# Patient Record
Sex: MALE | Race: WHITE | NOT HISPANIC OR LATINO | Employment: UNEMPLOYED | URBAN - METROPOLITAN AREA
[De-identification: names, ages, dates, MRNs, and addresses within clinical notes are randomized per-mention and may not be internally consistent; named-entity substitution may affect disease eponyms.]

---

## 2024-04-09 ENCOUNTER — TRANSFERRED RECORDS (OUTPATIENT)
Dept: HEALTH INFORMATION MANAGEMENT | Facility: CLINIC | Age: 59
End: 2024-04-09
Payer: COMMERCIAL

## 2024-04-09 LAB
ALT SERPL-CCNC: 9 U/L (ref 21–72)
AST SERPL-CCNC: 19 U/L (ref 17–45)
CREATININE (EXTERNAL): 1.1 MG/DL (ref 0.8–1.5)
GFR ESTIMATED (EXTERNAL): 79 ML/MIN (ref 60–137)
GLUCOSE (EXTERNAL): 88 MG/DL (ref 70–110)
POTASSIUM (EXTERNAL): 4.2 MMOL/L (ref 3.5–5.5)
TSH SERPL-ACNC: 1.26 UIU/ML (ref 4.5–4.5)

## 2024-04-10 ENCOUNTER — TRANSFERRED RECORDS (OUTPATIENT)
Dept: HEALTH INFORMATION MANAGEMENT | Facility: CLINIC | Age: 59
End: 2024-04-10
Payer: COMMERCIAL

## 2024-04-24 ENCOUNTER — TRANSFERRED RECORDS (OUTPATIENT)
Dept: HEALTH INFORMATION MANAGEMENT | Facility: CLINIC | Age: 59
End: 2024-04-24
Payer: COMMERCIAL

## 2024-04-24 ENCOUNTER — MEDICAL CORRESPONDENCE (OUTPATIENT)
Dept: HEALTH INFORMATION MANAGEMENT | Facility: CLINIC | Age: 59
End: 2024-04-24
Payer: COMMERCIAL

## 2024-04-25 ENCOUNTER — TRANSCRIBE ORDERS (OUTPATIENT)
Dept: OTHER | Age: 59
End: 2024-04-25

## 2024-04-25 ENCOUNTER — TELEPHONE (OUTPATIENT)
Dept: CALL CENTER | Age: 59
End: 2024-04-25
Payer: COMMERCIAL

## 2024-04-25 DIAGNOSIS — H52.223 REGULAR ASTIGMATISM WITH PRESBYOPIA, BILATERAL: ICD-10-CM

## 2024-04-25 DIAGNOSIS — H47.10 PAPILLEDEMA, LEFT EYE: ICD-10-CM

## 2024-04-25 DIAGNOSIS — H47.20 OPTIC ATROPHY OF RIGHT EYE: ICD-10-CM

## 2024-04-25 DIAGNOSIS — R17 ELEVATED BILIRUBIN: Primary | ICD-10-CM

## 2024-04-25 DIAGNOSIS — H52.4 REGULAR ASTIGMATISM WITH PRESBYOPIA, BILATERAL: ICD-10-CM

## 2024-04-25 DIAGNOSIS — H46.9 OPTIC NEURITIS, RIGHT: ICD-10-CM

## 2024-04-25 NOTE — TELEPHONE ENCOUNTER
[facility] Call Center    Phone Message    May a detailed message be left on voicemail: yes     Reason for Call: Appointment Intake    Referring Provider Name: Dr Parrish Marion  [facility] (Eye dept)   [phone]  Fax [phone]   Diagnosis and/or Symptoms: Elevated bilirubin [R17]  Optic atrophy of right eye [H47.20]  Optic neuritis, right [H46.9]  Papilledema, left eye [H47.10]  Regular astigmatism with presbyopia, bilateral [H52.223, H52.4], Urgent: 3-5 Days    Dina from [facility] calling to check on status of referral. Per referral patient needs to be seen within 3-5 days. Please call patient back at [phone] to schedule. Thank you.     Action Taken: Message routed to:  [facility] Eye    Travel Screening: Not Applicable

## 2024-04-26 NOTE — TELEPHONE ENCOUNTER
FUTURE VISIT INFORMATION      FUTURE VISIT INFORMATION:  Date: 5/1/24  Time: 9:30am  Location: [facility]  REFERRAL INFORMATION:  Referring provider:  Dr Parrish Marion   Referring providers clinic:  [facility]   Reason for visit/diagnosis   Elevated bilirubin   Optic atrophy of right eye   Optic neuritis, right   Papilledema, left eye   Regular astigmatism with presbyopia, bilateral       RECORDS REQUESTED FROM:       Clinic name Comments Records Status Imaging Status   [facility]  Recs scanned into chart under 4/24/24 [facility] ER ER visit 4/23/24     Imaging MRI 4/10/24- [facility]- requested image be pushed , resent 4/29         [place] Unable to push images to PACS. Tracking slip for over night [employer] sent Track # [id number]      Made Over night [employer] tracking :[id number]

## 2024-04-26 NOTE — TELEPHONE ENCOUNTER
Spoke pt at 0925    Notes/mri report in system    Decrease right eye vision ?since last summer (20/200 vision)    Pt seen twice in last month and worsening swelling concern of nerve in left eye (better seeing eye)    Open new time slot next Wednesday with Dr. Heath at [facility] location and scheduled pt    Pt aware of date/time/location/duration//main clinic number/non-[employer] insurance.    Silviano Vieira RN 9:40 AM 04/26/24

## 2024-04-28 NOTE — PROGRESS NOTES
Russ Pickett is a 58 year old male with the following diagnoses:   1. Optic neuropathy - Both Eyes    2. Optic atrophy of right eye    3. Papilledema, left eye         Patient was sent for consultation by Dr. Marion for optic neuritis/optic nerve edema OS    HPI:  His RIGHT eye started watering in January and soonthereafter he noted that he could not see well out of the RIGHT eye.  He did not go see anyone until a few weeks ago.  He was told that his left  optic nerve was swollen and he had optic atrophy RIGHT eye. He thinks the RIGHT eye has improved from a few weeks ago.  His LEFT eye has gotten worse.  He denies pain to touch or move the eyes. He had an MRI that was questionable but no definitive optic neuritis.      Independent historians:  Patient    Review of outside testin/10/24 MRI Brain WWO:    24 Labs   CRP-normal   HGB-normal  BARB-negative     My interpretation performed today of outside testing:  These have been requested        Review of outside clinical notes:    24 -- Visit with Dr. Marion        Past medical history:    There is no problem list on file for this patient.        Medications:     HYDROcodone-acetaminophen  OMEGA-3 FISH OIL PO      Family history / social history:  Patient's mother had cataracts.     Patient  reports that he has never smoked. He does not have any smokeless tobacco history on file. He reports current alcohol use. He reports that he does not use drugs. He drives for [employer].       Exam:  Visual acuity is 20/250 RIGHT eye and 20/40 LEFT eye.  His color vision is reduced both eyes.  He has obvious optic atrophy in the right eye and optic disc edema in the left eye with cotton-wool spots and hemorrhages    Tests ordered and interpreted today:    Visual field is diminished globally in the right eye and shows an altitudinal defect superiorly in the left eye as well as an inferior arcuate defect in the left eye.  OCT shows diffuse loss of the nerve fiber  layer in the right eye and optic disc edema in the left eye.      Discussion of management / interpretation with another provider:   None    Assessment/Plan:   It is my impression that patient has an optic neuropathy.  Unfortunately I do not have his MRI images for my review.  There was a question of whether there was perineural inflammation in the right eye.  This could be consistent with optic perineuritis.  Apparently the MRI images were sent to me but they have not been received.  In an abundance of caution I will start him on prednisone 80 mg a day for 1 week followed by a taper over the course of the following week.  I will take a look at his MRI and if there is clear inflammation present and I will probably increase the prednisone over the course of 6 to 8 weeks.  I will obtain a laboratory workup for atypical optic neuritis.  I will have him follow-up in 2 weeks sooner as needed for worsening symptoms.    The patient is presenting with an - acute illness that potentially poses a threat to life or bodily function (vision).                    Attending Physician Attestation:  Complete documentation of historical and exam elements from today's encounter can be found in the full encounter summary report (not reduplicated in this progress note).  I personally obtained the chief complaint(s) and history of present illness.  I confirmed and edited as necessary the review of systems, past medical/surgical history, family history, social history, and examination findings as documented by others; and I examined the patient myself.  I personally reviewed the relevant tests, images, and reports as documented above.  I formulated and edited as necessary the assessment and plan and discussed the findings and management plan with the patient and family. I personally reviewed the ophthalmic test(s) associated with this encounter, agree with the interpretation(s) as documented by the resident/fellow, and have edited the  corresponding report(s) as necessary.  - Mckay Heath MD           Precharting:  Cheng Jones MD   Fellow, Neuro-Ophthalmology

## 2024-05-01 ENCOUNTER — PRE VISIT (OUTPATIENT)
Dept: OPHTHALMOLOGY | Facility: CLINIC | Age: 59
End: 2024-05-01

## 2024-05-01 ENCOUNTER — OFFICE VISIT (OUTPATIENT)
Dept: OPHTHALMOLOGY | Facility: CLINIC | Age: 59
End: 2024-05-01
Payer: COMMERCIAL

## 2024-05-01 ENCOUNTER — LAB (OUTPATIENT)
Dept: LAB | Facility: CLINIC | Age: 59
End: 2024-05-01
Payer: COMMERCIAL

## 2024-05-01 DIAGNOSIS — H47.10 PAPILLEDEMA, LEFT EYE: ICD-10-CM

## 2024-05-01 DIAGNOSIS — H46.9 OPTIC NEUROPATHY: Primary | ICD-10-CM

## 2024-05-01 DIAGNOSIS — H53.40 VISUAL FIELD DEFECT: Primary | ICD-10-CM

## 2024-05-01 DIAGNOSIS — H47.20 OPTIC ATROPHY OF RIGHT EYE: ICD-10-CM

## 2024-05-01 DIAGNOSIS — H46.9 OPTIC NEUROPATHY: ICD-10-CM

## 2024-05-01 LAB
B BURGDOR IGG+IGM SER QL: 0.51
BASOPHILS # BLD AUTO: 0.1 10E3/UL (ref 0–0.2)
BASOPHILS NFR BLD AUTO: 2 %
EOSINOPHIL # BLD AUTO: 0.1 10E3/UL (ref 0–0.7)
EOSINOPHIL NFR BLD AUTO: 1 %
ERYTHROCYTE [DISTWIDTH] IN BLOOD BY AUTOMATED COUNT: 13 % (ref 10–15)
HCT VFR BLD AUTO: 40.7 % (ref 40–53)
HGB BLD-MCNC: 13.7 G/DL (ref 13.3–17.7)
IMM GRANULOCYTES # BLD: 0 10E3/UL
IMM GRANULOCYTES NFR BLD: 0 %
LYMPHOCYTES # BLD AUTO: 1.4 10E3/UL (ref 0.8–5.3)
LYMPHOCYTES NFR BLD AUTO: 34 %
MCH RBC QN AUTO: 31 PG (ref 26.5–33)
MCHC RBC AUTO-ENTMCNC: 33.7 G/DL (ref 31.5–36.5)
MCV RBC AUTO: 92 FL (ref 78–100)
MONOCYTES # BLD AUTO: 0.2 10E3/UL (ref 0–1.3)
MONOCYTES NFR BLD AUTO: 6 %
NEUTROPHILS # BLD AUTO: 2.4 10E3/UL (ref 1.6–8.3)
NEUTROPHILS NFR BLD AUTO: 57 %
NRBC # BLD AUTO: 0 10E3/UL
NRBC BLD AUTO-RTO: 0 /100
PLATELET # BLD AUTO: 257 10E3/UL (ref 150–450)
RBC # BLD AUTO: 4.42 10E6/UL (ref 4.4–5.9)
T PALLIDUM AB SER QL: NONREACTIVE
WBC # BLD AUTO: 4.2 10E3/UL (ref 4–11)

## 2024-05-01 PROCEDURE — 99205 OFFICE O/P NEW HI 60 MIN: CPT | Mod: GC | Performed by: OPHTHALMOLOGY

## 2024-05-01 PROCEDURE — 36415 COLL VENOUS BLD VENIPUNCTURE: CPT | Performed by: PATHOLOGY

## 2024-05-01 PROCEDURE — 86780 TREPONEMA PALLIDUM: CPT | Performed by: OPHTHALMOLOGY

## 2024-05-01 PROCEDURE — 92083 EXTENDED VISUAL FIELD XM: CPT | Mod: GC | Performed by: OPHTHALMOLOGY

## 2024-05-01 PROCEDURE — 82164 ANGIOTENSIN I ENZYME TEST: CPT | Mod: 90 | Performed by: PATHOLOGY

## 2024-05-01 PROCEDURE — 85025 COMPLETE CBC W/AUTO DIFF WBC: CPT | Performed by: PATHOLOGY

## 2024-05-01 PROCEDURE — 86053 AQAPRN-4 ANTB FLO CYTMTRY EA: CPT | Mod: 90 | Performed by: PATHOLOGY

## 2024-05-01 PROCEDURE — 86363 MOG-IGG1 ANTB FLO CYTMTRY EA: CPT | Mod: 90 | Performed by: PATHOLOGY

## 2024-05-01 PROCEDURE — 92133 CPTRZD OPH DX IMG PST SGM ON: CPT | Mod: GC | Performed by: OPHTHALMOLOGY

## 2024-05-01 PROCEDURE — 86618 LYME DISEASE ANTIBODY: CPT | Performed by: OPHTHALMOLOGY

## 2024-05-01 PROCEDURE — 86038 ANTINUCLEAR ANTIBODIES: CPT | Performed by: OPHTHALMOLOGY

## 2024-05-01 PROCEDURE — 99000 SPECIMEN HANDLING OFFICE-LAB: CPT | Performed by: PATHOLOGY

## 2024-05-01 RX ORDER — PREDNISONE 20 MG/1
TABLET ORAL
Qty: 41 TABLET | Refills: 0 | Status: SHIPPED | OUTPATIENT
Start: 2024-05-01 | End: 2024-05-16

## 2024-05-01 ASSESSMENT — TONOMETRY
OD_IOP_MMHG: 15
IOP_METHOD: ICARE
OS_IOP_MMHG: 8

## 2024-05-01 ASSESSMENT — CONF VISUAL FIELD
OD_NORMAL: 1
OD_INFERIOR_NASAL_RESTRICTION: 0
OS_INFERIOR_TEMPORAL_RESTRICTION: 0
OS_INFERIOR_NASAL_RESTRICTION: 0
OS_SUPERIOR_NASAL_RESTRICTION: 0
OS_NORMAL: 1
METHOD: COUNTING FINGERS
OS_SUPERIOR_TEMPORAL_RESTRICTION: 0
OD_SUPERIOR_NASAL_RESTRICTION: 0
OD_INFERIOR_TEMPORAL_RESTRICTION: 0
OD_SUPERIOR_TEMPORAL_RESTRICTION: 0

## 2024-05-01 ASSESSMENT — VISUAL ACUITY
OS_SC+: +2
METHOD: SNELLEN - LINEAR
OD_SC: 20/250
OS_PH_SC+: +2
OS_PH_SC: 20/30
OD_PH_SC: 20/200
OS_SC: 20/40

## 2024-05-01 ASSESSMENT — SLIT LAMP EXAM - LIDS
COMMENTS: NORMAL
COMMENTS: NORMAL

## 2024-05-01 ASSESSMENT — EXTERNAL EXAM - RIGHT EYE: OD_EXAM: NORMAL

## 2024-05-01 ASSESSMENT — EXTERNAL EXAM - LEFT EYE: OS_EXAM: NORMAL

## 2024-05-01 ASSESSMENT — CUP TO DISC RATIO
OD_RATIO: 0.3
OS_RATIO: 0.1

## 2024-05-01 NOTE — LETTER
May 1, 2024     RE:  :  MRN: Russ Pickett  1965  [id number]     Dear Dr. Marion    Thank you for asking me to see your very pleasant patient,Russ Pickett, in neuro-ophthalmic consultation.  I would like to thank you for sending your records and I have summarized them in the history of present illness.   My assessment and plan are below.  For further details, please see my attached clinic note.      Russ Pickett is a 58 year old male with the following diagnoses:   1. Optic neuropathy - Both Eyes    2. Optic atrophy of right eye    3. Papilledema, left eye         Patient was sent for consultation by Dr. Marion for optic neuritis/optic nerve edema OS    HPI:  His RIGHT eye started watering in January and soonthereafter he noted that he could not see well out of the RIGHT eye.  He did not go see anyone until a few weeks ago.  He was told that his left  optic nerve was swollen and he had optic atrophy RIGHT eye. He thinks the RIGHT eye has improved from a few weeks ago.  His LEFT eye has gotten worse.  He denies pain to touch or move the eyes. He had an MRI that was questionable but no definitive optic neuritis.      Independent historians:  Patient    Review of outside testin/10/24 MRI Brain WWO:    24 Labs   CRP-normal   HGB-normal  BARB-negative     My interpretation performed today of outside testing:  These have been requested        Review of outside clinical notes:    24 -- Visit with Dr. Marion        Past medical history:    There is no problem list on file for this patient.        Medications:     HYDROcodone-acetaminophen  OMEGA-3 FISH OIL PO      Family history / social history:  Patient's mother had cataracts.     Patient  reports that he has never smoked. He does not have any smokeless tobacco history on file. He reports current alcohol use. He reports that he does not use drugs. He drives for [employer].       Exam:  Visual acuity is 20/250 RIGHT eye and 20/40 LEFT eye.   His color vision is reduced both eyes.  He has obvious optic atrophy in the right eye and optic disc edema in the left eye with cotton-wool spots and hemorrhages    Tests ordered and interpreted today:    Visual field is diminished globally in the right eye and shows an altitudinal defect superiorly in the left eye as well as an inferior arcuate defect in the left eye.  OCT shows diffuse loss of the nerve fiber layer in the right eye and optic disc edema in the left eye.      Discussion of management / interpretation with another provider:   None    Assessment/Plan:   It is my impression that patient has an optic neuropathy.  Unfortunately I do not have his MRI images for my review.  There was a question of whether there was perineural inflammation in the right eye.  This could be consistent with optic perineuritis.  Apparently the MRI images were sent to me but they have not been received.  In an abundance of caution I will start him on prednisone 80 mg a day for 1 week followed by a taper over the course of the following week.  I will take a look at his MRI and if there is clear inflammation present and I will probably increase the prednisone over the course of 6 to 8 weeks.  I will obtain a laboratory workup for atypical optic neuritis.  I will have him follow-up in 2 weeks sooner as needed for worsening symptoms.    The patient is presenting with an - acute illness that potentially poses a threat to life or bodily function (vision).                  Again, thank you for allowing me to participate in the care of your patient.      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

## 2024-05-02 LAB
ACE SERPL-CCNC: 25 U/L
ANA SER QL IF: NEGATIVE

## 2024-05-06 LAB
AQP4 H2O CHANNEL IGG SERPL QL: NEGATIVE
CNS DEMYELINATING DISEASE INTERPRETATION, SERUM: NORMAL
MOG FACS, S: NEGATIVE

## 2024-05-06 NOTE — RESULT ENCOUNTER NOTE
Dear Russ,     Your results were in the acceptable range. I still have not received your MRI.  Did your vision change at all with the prednisone?     Please call my office if your symptoms worsen.    Thank you for allowing me to share in your care.     Sincerely,      Mckay Heath MD

## 2024-05-08 ENCOUNTER — TELEPHONE (OUTPATIENT)
Dept: OPHTHALMOLOGY | Facility: CLINIC | Age: 59
End: 2024-05-08
Payer: COMMERCIAL

## 2024-05-08 NOTE — TELEPHONE ENCOUNTER
Recommend new tablet-- pt may contact pharmacy to review if has information on efficacy or safety of medication following microwaves to tablet    If pharmacy provides ok-- ok per clinic, otherwise would recommend new tablet    Updated pt at 0900 and pt verbally demonstrated understanding.    Silviano Vieira RN 9:02 AM 05/08/24

## 2024-05-08 NOTE — TELEPHONE ENCOUNTER
[facility] Call Center    Phone Message    May a detailed message be left on voicemail: yes     Reason for Call: Other: Patient states his predniSONE (DELTASONE) 20 MG tablet was on his plate with a muffin and he accidentally microwaved it for approximately 15 seconds.  He is wondering if it is still ok to take the medication. I suggested the pharmacy, but he would like to speak with the care team.  Please call.  Thank you.      Action Taken: Message routed to:  [facility] Ophthalmology    Travel Screening: Not Applicable

## 2024-05-15 ENCOUNTER — OFFICE VISIT (OUTPATIENT)
Dept: OPHTHALMOLOGY | Facility: CLINIC | Age: 59
End: 2024-05-15
Payer: COMMERCIAL

## 2024-05-15 DIAGNOSIS — H53.40 VISUAL FIELD DEFECT: Primary | ICD-10-CM

## 2024-05-15 DIAGNOSIS — H46.9 OPTIC NEUROPATHY: Primary | ICD-10-CM

## 2024-05-15 DIAGNOSIS — H47.20 OPTIC ATROPHY OF RIGHT EYE: ICD-10-CM

## 2024-05-15 PROCEDURE — 92133 CPTRZD OPH DX IMG PST SGM ON: CPT | Mod: GC | Performed by: OPHTHALMOLOGY

## 2024-05-15 PROCEDURE — 99214 OFFICE O/P EST MOD 30 MIN: CPT | Mod: GC | Performed by: OPHTHALMOLOGY

## 2024-05-15 PROCEDURE — 92083 EXTENDED VISUAL FIELD XM: CPT | Mod: GC | Performed by: OPHTHALMOLOGY

## 2024-05-15 RX ORDER — PREDNISONE 20 MG/1
10 TABLET ORAL DAILY
Qty: 4 TABLET | Refills: 0 | Status: SHIPPED | OUTPATIENT
Start: 2024-05-15 | End: 2024-05-23

## 2024-05-15 ASSESSMENT — VISUAL ACUITY
OS_PH_SC+: -1
OS_SC: 20/50
OD_SC: 20/200
OS_PH_SC: 20/40
METHOD: SNELLEN - LINEAR
OS_SC+: -2

## 2024-05-15 ASSESSMENT — TONOMETRY
OS_IOP_MMHG: 8
IOP_METHOD: ICARE
OD_IOP_MMHG: 7

## 2024-05-15 ASSESSMENT — CUP TO DISC RATIO
OD_RATIO: 0.2
OS_RATIO: 0.1

## 2024-05-15 ASSESSMENT — CONF VISUAL FIELD
OD_SUPERIOR_TEMPORAL_RESTRICTION: 0
OS_INFERIOR_NASAL_RESTRICTION: 0
OS_SUPERIOR_NASAL_RESTRICTION: 0
OD_INFERIOR_TEMPORAL_RESTRICTION: 0
METHOD: COUNTING FINGERS
OD_INFERIOR_NASAL_RESTRICTION: 0
OS_SUPERIOR_TEMPORAL_RESTRICTION: 0
OS_INFERIOR_TEMPORAL_RESTRICTION: 0
OD_SUPERIOR_NASAL_RESTRICTION: 0
OD_NORMAL: 1
OS_NORMAL: 1

## 2024-05-15 ASSESSMENT — EXTERNAL EXAM - LEFT EYE: OS_EXAM: NORMAL

## 2024-05-15 ASSESSMENT — SLIT LAMP EXAM - LIDS
COMMENTS: NORMAL
COMMENTS: NORMAL

## 2024-05-15 ASSESSMENT — EXTERNAL EXAM - RIGHT EYE: OD_EXAM: NORMAL

## 2024-05-15 NOTE — NURSING NOTE
Chief Complaints and History of Present Illnesses   Patient presents with    Follow Up     Chief Complaint(s) and History of Present Illness(es)       Follow Up              Laterality: both eyes    Onset: 2 weeks ago    Severity: moderate    Frequency: constantly              Comments    Patient feels vision improved.  Currently using prednisone 1/2 tablet today and then has 1/2 tablet tomorrow.  Denies eye pain.      Juliana Oden on 5/15/2024 at 7:36 AM

## 2024-05-15 NOTE — PROGRESS NOTES
Assessment & Plan     Russ Pickett is a 58 year old male with the following diagnoses:   1. Optic neuropathy - Both Eyes    2. Optic atrophy of right eye         Follow up optic neuropathy RIGHT eye. Last visit was 5/1/24. At that time, I placed him on prednisone 80 mg/day x 1 week with a taper over the second week. I was waiting on his MRI. I was informed that we have not received the MRI as of today.  Inflammatory/infectious labs ordered last visit were negative. The patient had optic atrophy RIGHT eye and optic disc edema LEFT eye last visit. His visual acuity was 20/200 RIGHT eye and 20/30 LEFT eye. Color vision was 4/11 right eye and 9/11 left eye at last visit.      Today, patient states that his vision seems to be improved compared to last visit. He only has two days left of oral prednisone. Denies any eye pain or headaches.     Visual acuity is 20/200 RIGHT eye and 20/40 with pinhole LEFT eye.  Color vision is 1/11 RIGHT eye and 7/11 LEFT eye.  Pupils are sluggish both eyes without afferent pupillary defect. Anterior segment no signs of inflammation. Posterior segment with stable mod-severe optic nerve pallor OD, trace edema with mod pallor and resolving inferior disc heme OS.     OCT RNFL:  severe diffuse thinning stable OD, improvement in RNFL thickening now with temporal thinning OS   VF: dense 360 constriction with small central island OD, Dense 360 constriction with larger central island compared to right OS     It is my impression that patient has bilateral optic atrophy of unknown etiology at this time. We are waiting on MRI images to be sent for further evaluation but at this time all inflammatory/infectious labs have come back as negative. If MRI comes back normal this most likely is secondary to sequential NAION. In the meantime, I will keep him on prednisone 10 mg/day until then.  Follow up 4 weeks.               Cheng Jones MD   Fellow, Neuro-Ophthalmology      Attending  Physician Attestation:  Complete documentation of historical and exam elements from today's encounter can be found in the full encounter summary report (not reduplicated in this progress note).  I personally obtained the chief complaint(s) and history of present illness.  I confirmed and edited as necessary the review of systems, past medical/surgical history, family history, social history, and examination findings as documented by others; and I examined the patient myself.  I personally reviewed the relevant tests, images, and reports as documented above.  I formulated and edited as necessary the assessment and plan and discussed the findings and management plan with the patient and family. I personally reviewed the ophthalmic test(s) associated with this encounter, agree with the interpretation(s) as documented by the resident/fellow, and have edited the corresponding report(s) as necessary.  - Mckay Heath MD

## 2024-05-21 ENCOUNTER — TELEPHONE (OUTPATIENT)
Dept: OPHTHALMOLOGY | Facility: CLINIC | Age: 59
End: 2024-05-21
Payer: COMMERCIAL

## 2024-05-21 NOTE — TELEPHONE ENCOUNTER
Called patient re: MRI review. Left message.     Dr. Heath was able to review your MRI. This does not show any inflammation. Dr. Heath would like you to reduce your prednisone to 5mg/day x 3 day (1/2 pill / day x 3 days), then stop prednisone altogether.     Left callback for questions.     Qian Held on 5/21/2024 at 4:06 PM

## 2024-05-21 NOTE — TELEPHONE ENCOUNTER
----- Message from Mckay Heath MD sent at 5/21/2024  1:38 PM CDT -----  Can you please tell him that MRI does not show any inflammation.  He can reduce his prednisone to 5 mg/day (break them in half) x 3 days then stop. I will see him as scheduled in a few weeks.  He should call if his vision worsens. Thanks.  ISMAEL  ----- Message -----  From: Qian Morris  Sent: 5/15/2024   9:30 AM CDT  To: Mckay Heath MD    I did call again to have them re-ship.     Citlali  ----- Message -----  From: Mckay Heath MD  Sent: 5/15/2024   8:48 AM CDT  To: Qian Morris    I really need to see his MRI in the next few days. Thanks

## 2024-05-28 ENCOUNTER — TELEPHONE (OUTPATIENT)
Dept: OPHTHALMOLOGY | Facility: CLINIC | Age: 59
End: 2024-05-28
Payer: COMMERCIAL

## 2024-05-28 NOTE — TELEPHONE ENCOUNTER
[facility] Call Center    Phone Message    May a detailed message be left on voicemail: yes     Reason for Call: Other: Patient is calling to ask questions about the medication that Dr. Heath gave him to take.  Patient is asking to speak with nurse Qian in the clinic.  Best number to reach patient is [phone].  Thank you!      Action Taken: Message routed to:  [facility] Eye     Travel Screening: Not Applicable

## 2024-05-28 NOTE — TELEPHONE ENCOUNTER
Returned patient's call at 1517. Left message.    Dr. Heath wants to titrate patient off of prednisone medication. Dr. Heath instructs patient to take 5mg/day x3 days, and then stop taking the medication altogether.     Left explicit instructions and callback for further questions.     Qian Held on 5/28/2024 at 3:20 PM

## 2024-05-28 NOTE — TELEPHONE ENCOUNTER
[facility] Call Center    Phone Message    May a detailed message be left on voicemail: yes     Reason for Call: Medication Question or concern regarding medication   Prescription Clarification  Name of Medication: prednisolone  Prescribing Provider: Kumar   Pharmacy: n/a   What on the order needs clarification? Pt states he only has a corner (half of a half tablet) left and he would like to know if he should take it. If he takes it he'll be completely done with his medication.    Please reach back out to pt and advise.      Action Taken: Message routed to:  [facility] eye    Travel Screening: Not Applicable

## 2024-05-28 NOTE — TELEPHONE ENCOUNTER
A user error has taken place: encounter opened in error, closed for administrative reasons. TE not needed

## 2024-05-28 NOTE — TELEPHONE ENCOUNTER
Returned patient's call. It is not necessary for patient to take last 1/4 pill. Patient agreeable to plan.    Patient reported symptoms have been improving and vision seems to be getting better.     Qian Held on 5/28/2024 at 4:35 PM

## 2024-06-26 ENCOUNTER — OFFICE VISIT (OUTPATIENT)
Dept: OPHTHALMOLOGY | Facility: CLINIC | Age: 59
End: 2024-06-26
Payer: COMMERCIAL

## 2024-06-26 DIAGNOSIS — H53.40 VISUAL FIELD DEFECT: Primary | ICD-10-CM

## 2024-06-26 DIAGNOSIS — H47.013 ANTERIOR ISCHEMIC OPTIC NEUROPATHY OF BOTH EYES: Primary | ICD-10-CM

## 2024-06-26 PROCEDURE — 92014 COMPRE OPH EXAM EST PT 1/>: CPT | Performed by: OPHTHALMOLOGY

## 2024-06-26 PROCEDURE — 92083 EXTENDED VISUAL FIELD XM: CPT | Performed by: OPHTHALMOLOGY

## 2024-06-26 PROCEDURE — 92133 CPTRZD OPH DX IMG PST SGM ON: CPT | Performed by: OPHTHALMOLOGY

## 2024-06-26 ASSESSMENT — CONF VISUAL FIELD
OS_SUPERIOR_TEMPORAL_RESTRICTION: 0
OS_INFERIOR_TEMPORAL_RESTRICTION: 0
OS_NORMAL: 1
OD_SUPERIOR_TEMPORAL_RESTRICTION: 0
OD_SUPERIOR_NASAL_RESTRICTION: 0
OS_INFERIOR_NASAL_RESTRICTION: 0
OD_INFERIOR_NASAL_RESTRICTION: 0
OD_NORMAL: 1
METHOD: COUNTING FINGERS
OS_SUPERIOR_NASAL_RESTRICTION: 0
OD_INFERIOR_TEMPORAL_RESTRICTION: 0

## 2024-06-26 ASSESSMENT — VISUAL ACUITY
METHOD: SNELLEN - LINEAR
CORRECTION_TYPE: GLASSES
OS_SC: 20/50 ECC
OS_SC+: +2
OS_PH_SC: 20/30
OD_SC: 20/200 ECC
OS_PH_SC+: -2

## 2024-06-26 ASSESSMENT — SLIT LAMP EXAM - LIDS
COMMENTS: NORMAL
COMMENTS: NORMAL

## 2024-06-26 ASSESSMENT — EXTERNAL EXAM - LEFT EYE: OS_EXAM: NORMAL

## 2024-06-26 ASSESSMENT — CUP TO DISC RATIO
OS_RATIO: 0.1
OD_RATIO: 0.15

## 2024-06-26 ASSESSMENT — TONOMETRY
OD_IOP_MMHG: 9
OS_IOP_MMHG: 11
IOP_METHOD: ICARE

## 2024-06-26 ASSESSMENT — EXTERNAL EXAM - RIGHT EYE: OD_EXAM: NORMAL

## 2024-06-26 NOTE — NURSING NOTE
Chief Complaints and History of Present Illnesses   Patient presents with    optic neuropathy     Both eyes      Chief Complaint(s) and History of Present Illness(es)       optic neuropathy              Comments: Both eyes               Comments    Patient here for 1 month follow up of optic neuropathy.  Vision is a little improved since last visit but still decreased compared to baseline at both distance and near.  He is hoping to make a full recovery.  Denies eye pain.  Denies new flashes or floaters.     Juliana Oden on 6/26/2024 at 7:31 AM

## 2024-06-26 NOTE — PATIENT INSTRUCTIONS
You can also watch a video on this topic here:     [email or web address]

## 2024-06-26 NOTE — PROGRESS NOTES
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 Last visit was 5/15/24. At that time, I kept him on prednisone 10 mg/day awaiting the results of his most recent MRI. The patient had optic atrophy RIGHT eye and trace optic disc edema LEFT eye last visit. His visual acuity was 20/200 RIGHT eye and 20/40 pinhole LEFT eye. Color vision was 1/11 right eye and 7/11 left eye at last visit.      On 5/28, we called him to share MRI results which showed no evidence of perineural inflammation or mass. The patient did complete his course of prednisone, last dose being about three weeks ago.     Today, he reports that his vision has progressively gotten better. No ocular pain or headaches. No new flashing lights or floaters.       4/10/24 -- MR Brain w/wo Contrast      Visual acuity is 20/200 RIGHT eye and 20/30 -2 with pinhole LEFT eye.  Color vision is 3/11 RIGHT eye and 7/11 LEFT eye.  Pupils are reactive without afferent pupillary defect. Anterior segment no signs of inflammation. Posterior segment with stable mod-severe optic nerve pallor OD, no disc edema or heme today OS.    June 26, 2024 - OCT RNFL:  severe diffuse thinning stable OD, thickening decreased, now diffusely thin OS     June 26, 2024 - Visual Fields   Right eye: good reliability, mean deviation of -23.8 (from -27.8 last visit), dense supranasal defect with 360 constriction, overall stable or mildly improved from last test  Left eye: good reliability, mean deviation of  -17.3 (from -22.3 last visit), superior defects, mildly improved      It is my impression that patient has bilateral optic atrophy.  I believe that he most likely experienced anterior ischemic optic neuropathy in the right eye back in January.  He suffered another attack in the left eye several months later.  Today his optic disc edema has  resolved in both eyes.  I would anticipate that his vision remains stable from here.  He is interested in seeing better and I recommended that he see low vision optometry for a refraction.               Brittnee Arias, MS4    Attending Physician Attestation:  Complete documentation of historical and exam elements from today's encounter can be found in the full encounter summary report (not reduplicated in this progress note).  I personally obtained the chief complaint(s) and history of present illness.  I confirmed and edited as necessary the review of systems, past medical/surgical history, family history, social history, and examination findings as documented by others; and I examined the patient myself.  I personally reviewed the relevant tests, images, and reports as documented above.  I formulated and edited as necessary the assessment and plan and discussed the findings and management plan with the patient and family. I was present with the medical student who participated in the service and in the documentation of this note. - Mckay Heath MD

## 2024-06-26 NOTE — LETTER
2024     RE:  :  MRN: Russ Pickett  1965  [id number]     Dear Dr. Marion    Your patient,Russ Pickett, returned for neuro-ophthalmic follow up. My assessment and plan are below.  For further details, please see my attached clinic note.      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 Last visit was 5/15/24. At that time, I kept him on prednisone 10 mg/day awaiting the results of his most recent MRI. The patient had optic atrophy RIGHT eye and trace optic disc edema LEFT eye last visit. His visual acuity was 20/200 RIGHT eye and 20/40 pinhole LEFT eye. Color vision was 1/11 right eye and 7/11 left eye at last visit.      On , we called him to share MRI results which showed no evidence of perineural inflammation or mass. The patient did complete his course of prednisone, last dose being about three weeks ago.     Today, he reports that his vision has progressively gotten better. No ocular pain or headaches. No new flashing lights or floaters.       4/10/24 -- MR Brain w/wo Contrast      Visual acuity is 20/200 RIGHT eye and 20/30 -2 with pinhole LEFT eye.  Color vision is 3/11 RIGHT eye and 7/11 LEFT eye.  Pupils are reactive without afferent pupillary defect. Anterior segment no signs of inflammation. Posterior segment with stable mod-severe optic nerve pallor OD, no disc edema or heme today OS.    2024 - OCT RNFL:  severe diffuse thinning stable OD, thickening decreased, now diffusely thin OS     2024 - Visual Fields   Right eye: good reliability, mean deviation of -23.8 (from -27.8 last visit), dense supranasal defect with 360 constriction, overall stable or mildly improved from last test  Left eye: good reliability, mean deviation of  -17.3 (from -22.3 last visit), superior defects, mildly improved      It is my  impression that patient has bilateral optic atrophy.  I believe that he most likely experienced anterior ischemic optic neuropathy in the right eye back in January.  He suffered another attack in the left eye several months later.  Today his optic disc edema has resolved in both eyes.  I would anticipate that his vision remains stable from here.  He is interested in seeing better and I recommended that he see low vision optometry for a refraction.    Again, thank you for allowing me to participate in the care of your patient.      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

## 2024-07-18 ENCOUNTER — OFFICE VISIT (OUTPATIENT)
Dept: OPTOMETRY | Facility: CLINIC | Age: 59
End: 2024-07-18
Payer: COMMERCIAL

## 2024-07-18 DIAGNOSIS — H52.4 PRESBYOPIA: ICD-10-CM

## 2024-07-18 DIAGNOSIS — H47.013: Primary | ICD-10-CM

## 2024-07-18 DIAGNOSIS — H53.143 PHOTOPHOBIA OF BOTH EYES: ICD-10-CM

## 2024-07-18 RX ORDER — MULTIVITAMIN
1 TABLET ORAL
COMMUNITY

## 2024-07-18 ASSESSMENT — CONF VISUAL FIELD
OS_SUPERIOR_TEMPORAL_RESTRICTION: 0
OS_INFERIOR_NASAL_RESTRICTION: 0
OD_NORMAL: 1
OD_INFERIOR_TEMPORAL_RESTRICTION: 0
OD_SUPERIOR_NASAL_RESTRICTION: 0
METHOD: COUNTING FINGERS
OS_NORMAL: 1
OD_INFERIOR_NASAL_RESTRICTION: 0
OS_INFERIOR_TEMPORAL_RESTRICTION: 0
OS_SUPERIOR_NASAL_RESTRICTION: 0
OD_SUPERIOR_TEMPORAL_RESTRICTION: 0

## 2024-07-18 ASSESSMENT — VISUAL ACUITY
OS_SC: 20/30
OS_PH_SC: 20/25
METHOD: SNELLEN - LINEAR
OS_SC+: -1
OD_PH_SC: 20/100
OD_SC: 20/150
OD_PH_SC+: -1+1
OS_PH_SC+: -1

## 2024-07-18 ASSESSMENT — TONOMETRY
OD_IOP_MMHG: 17
OS_IOP_MMHG: 17
IOP_METHOD: ICARE

## 2024-07-18 ASSESSMENT — REFRACTION_MANIFEST
OD_ADD: +2.75
OD_SPHERE: -1.50
OS_SPHERE: -1.25
OS_ADD: +2.75
OD_CYLINDER: +1.00
OS_CYLINDER: +1.00
OD_AXIS: 040
OS_AXIS: 140

## 2024-07-18 ASSESSMENT — SLIT LAMP EXAM - LIDS
COMMENTS: NORMAL
COMMENTS: NORMAL

## 2024-07-18 ASSESSMENT — CUP TO DISC RATIO
OS_RATIO: 0.1
OD_RATIO: 0.15

## 2024-07-18 ASSESSMENT — EXTERNAL EXAM - LEFT EYE: OS_EXAM: NORMAL

## 2024-07-18 ASSESSMENT — EXTERNAL EXAM - RIGHT EYE: OD_EXAM: NORMAL

## 2024-07-18 NOTE — NURSING NOTE
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               Comments    Vision is largely unchanged since last exam. Pt is hopeful vision is able to be improved. Pt was given glasses from [facility] Dr Parrish Montemayor O.D in [place]- for all distances. Does not have them with.     BRENT Nuñez on 7/18/2024 at 8:23 AM

## 2024-07-18 NOTE — PROGRESS NOTES
Assessment/Plan  (H47.013) AAION (arteritic anterior ischemic optic neuropathy), bilateral  (primary encounter diagnosis)  Comment: Sequential. OD more impacted than OS  Plan: Discussed findings with patient. Discussed impact of condition on light sensitivity and vision potential. A light yellow clip on or fitover may be helpful for patient to maximize his contrast sensitivity. Advised patient that vision is unlikely to improve at this point but that recurrence of condition would also be improbable. Separate glasses may be easier to adapt to moving forward but could also be challenging for him as he pursues a full time  role. .    (H53.143) Photophobia of both eyes  Plan: See above note.     (H52.4) Presbyopia  Plan: Discussed findings with patient. New spectacle prescription dispensed to patient. Patient is welcome to return to clinic with prolonged adaptation difficulties.           45 minutes were spent on the date of the encounter doing chart review, history and exam, documentation, and further activities as noted above.    Complete documentation of historical and exam elements from today's encounter can  be found in the full encounter summary report (not reduplicated in this progress  note). I personally obtained the chief complaint(s) and history of present illness. I  confirmed and edited as necessary the review of systems, past medical/surgical  history, family history, social history, and examination findings as documented by  others; and I examined the patient myself. I personally reviewed the relevant tests,  images, and reports as documented above. I formulated and edited as necessary the  assessment and plan and discussed the findings and management plan with the  patient and family.    Eitan Crowe, OD

## 2025-03-12 ENCOUNTER — OFFICE VISIT (OUTPATIENT)
Dept: OPHTHALMOLOGY | Facility: CLINIC | Age: 60
End: 2025-03-12
Payer: COMMERCIAL

## 2025-03-12 DIAGNOSIS — H47.013 ANTERIOR ISCHEMIC OPTIC NEUROPATHY OF BOTH EYES: Primary | ICD-10-CM

## 2025-03-12 DIAGNOSIS — H52.4 PRESBYOPIA: ICD-10-CM

## 2025-03-12 RX ORDER — ASCORBIC ACID 1000 MG
TABLET ORAL
COMMUNITY

## 2025-03-12 ASSESSMENT — CUP TO DISC RATIO
OD_RATIO: 0.15
OS_RATIO: 0.1

## 2025-03-12 ASSESSMENT — SLIT LAMP EXAM - LIDS
COMMENTS: NORMAL
COMMENTS: NORMAL

## 2025-03-12 ASSESSMENT — CONF VISUAL FIELD
OS_SUPERIOR_NASAL_RESTRICTION: 0
OD_SUPERIOR_NASAL_RESTRICTION: 3
OS_SUPERIOR_TEMPORAL_RESTRICTION: 0
OS_INFERIOR_NASAL_RESTRICTION: 0
OS_NORMAL: 1
OS_INFERIOR_TEMPORAL_RESTRICTION: 0

## 2025-03-12 ASSESSMENT — REFRACTION_WEARINGRX
OS_AXIS: 140
OS_ADD: +2.75
OD_CYLINDER: +1.00
OD_AXIS: 040
OD_ADD: +2.75
OS_CYLINDER: +1.00
OD_SPHERE: -1.50
OS_SPHERE: -1.25
SPECS_TYPE: BIFOCAL

## 2025-03-12 ASSESSMENT — REFRACTION_MANIFEST
OD_AXIS: 040
OD_CYLINDER: +1.00
OD_SPHERE: -1.25
OS_CYLINDER: +1.25
OS_SPHERE: -1.50
OS_AXIS: 150

## 2025-03-12 ASSESSMENT — EXTERNAL EXAM - LEFT EYE: OS_EXAM: NORMAL

## 2025-03-12 ASSESSMENT — VISUAL ACUITY
OS_CC+: +2
OS_CC: 20/40
OS_PH_CC+: -2
METHOD: SNELLEN - LINEAR
CORRECTION_TYPE: GLASSES
OS_PH_CC: 20/30
OD_CC: 20/125

## 2025-03-12 ASSESSMENT — TONOMETRY
IOP_METHOD: ICARE
OS_IOP_MMHG: 10
OD_IOP_MMHG: 12

## 2025-03-12 ASSESSMENT — EXTERNAL EXAM - RIGHT EYE: OD_EXAM: NORMAL

## 2025-03-12 NOTE — NURSING NOTE
Chief Complaints and History of Present Illnesses   Patient presents with    Follow Up     AAION (arteritic anterior ischemic optic neuropathy), bilateral     Chief Complaint(s) and History of Present Illness(es)       Follow Up              Laterality: both eyes    Course: gradually improving    Associated symptoms: Negative for dryness, eye pain, tearing and photophobia    Treatments tried: glasses    Pain scale: 0/10    Comments: AAION (arteritic anterior ischemic optic neuropathy), bilateral              Comments    Patient states that his vision seems improved when he is not wearing his glasses.  So he is wanting to have his glasses Rx updated.  He is a  for [employer], so having his best distance vision is a priority for him.    Shahnaz Feliciano, COT 9:10 AM  March 12, 2025

## 2025-03-12 NOTE — PROGRESS NOTES
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 Stable findings today. OS correctable to 20/20-.   Plan: Continue monitoring regularly for changes. Adjusted Rx to shift near focal distance out a little. Reassured patient that findings appear otherwise stable.     (H52.4) Presbyopia  Plan: REFRACTION [[id number]]        Discussed findings with patient. New spectacle prescription dispensed to patient. Patient is welcome to return to clinic with prolonged adaptation difficulties.     30 minutes were spent on the date of the encounter doing chart review, history and exam, documentation, and further activities as noted above.    Complete documentation of historical and exam elements from today's encounter can  be found in the full encounter summary report (not reduplicated in this progress  note). I personally obtained the chief complaint(s) and history of present illness. I  confirmed and edited as necessary the review of systems, past medical/surgical  history, family history, social history, and examination findings as documented by  others; and I examined the patient myself. I personally reviewed the relevant tests,  images, and reports as documented above. I formulated and edited as necessary the  assessment and plan and discussed the findings and management plan with the  patient and family.    Eitan Crowe, VILMA